# Patient Record
Sex: MALE | Race: WHITE | NOT HISPANIC OR LATINO
[De-identification: names, ages, dates, MRNs, and addresses within clinical notes are randomized per-mention and may not be internally consistent; named-entity substitution may affect disease eponyms.]

---

## 2022-03-31 PROBLEM — Z00.00 ENCOUNTER FOR PREVENTIVE HEALTH EXAMINATION: Status: ACTIVE | Noted: 2022-03-31

## 2022-05-31 ENCOUNTER — APPOINTMENT (OUTPATIENT)
Age: 27
End: 2022-05-31
Payer: COMMERCIAL

## 2022-05-31 ENCOUNTER — NON-APPOINTMENT (OUTPATIENT)
Age: 27
End: 2022-05-31

## 2022-05-31 VITALS
RESPIRATION RATE: 14 BRPM | WEIGHT: 199 LBS | HEART RATE: 78 BPM | OXYGEN SATURATION: 98 % | DIASTOLIC BLOOD PRESSURE: 80 MMHG | HEIGHT: 67 IN | BODY MASS INDEX: 31.23 KG/M2 | SYSTOLIC BLOOD PRESSURE: 122 MMHG

## 2022-05-31 DIAGNOSIS — Z78.9 OTHER SPECIFIED HEALTH STATUS: ICD-10-CM

## 2022-05-31 DIAGNOSIS — Z80.9 FAMILY HISTORY OF MALIGNANT NEOPLASM, UNSPECIFIED: ICD-10-CM

## 2022-05-31 PROCEDURE — 99203 OFFICE O/P NEW LOW 30 MIN: CPT

## 2022-07-12 ENCOUNTER — APPOINTMENT (OUTPATIENT)
Age: 27
End: 2022-07-12

## 2022-07-12 VITALS
SYSTOLIC BLOOD PRESSURE: 110 MMHG | WEIGHT: 185 LBS | DIASTOLIC BLOOD PRESSURE: 70 MMHG | BODY MASS INDEX: 29.03 KG/M2 | HEART RATE: 90 BPM | OXYGEN SATURATION: 98 % | HEIGHT: 67 IN

## 2022-07-12 DIAGNOSIS — Z22.7 LATENT TUBERCULOSIS: ICD-10-CM

## 2022-07-12 DIAGNOSIS — R76.12 NONSPECIFIC REACTION TO CELL MEDIATED IMMUNITY MEASUREMENT OF GAMMA INTERFERON ANTIGEN RESPONSE W/OUT ACTIVE TUBERCULOSIS: ICD-10-CM

## 2022-07-12 PROCEDURE — 99213 OFFICE O/P EST LOW 20 MIN: CPT

## 2022-07-12 NOTE — END OF VISIT
[] : Fellow [[email or web address]] : patient seen and examined agree above note \par no sob cough or wheeze \par Gold tb repeated negative cxr negative \par no further work up \par follow up 1 year \par